# Patient Record
Sex: FEMALE | Race: WHITE | NOT HISPANIC OR LATINO | URBAN - METROPOLITAN AREA
[De-identification: names, ages, dates, MRNs, and addresses within clinical notes are randomized per-mention and may not be internally consistent; named-entity substitution may affect disease eponyms.]

---

## 2019-03-12 ENCOUNTER — EMERGENCY (EMERGENCY)
Facility: HOSPITAL | Age: 54
LOS: 0 days | Discharge: HOME | End: 2019-03-12
Attending: EMERGENCY MEDICINE | Admitting: EMERGENCY MEDICINE

## 2019-03-12 VITALS
OXYGEN SATURATION: 97 % | SYSTOLIC BLOOD PRESSURE: 110 MMHG | HEART RATE: 66 BPM | DIASTOLIC BLOOD PRESSURE: 60 MMHG | RESPIRATION RATE: 18 BRPM

## 2019-03-12 VITALS
OXYGEN SATURATION: 99 % | DIASTOLIC BLOOD PRESSURE: 81 MMHG | WEIGHT: 229.94 LBS | SYSTOLIC BLOOD PRESSURE: 128 MMHG | HEIGHT: 64 IN | TEMPERATURE: 98 F | HEART RATE: 68 BPM | RESPIRATION RATE: 18 BRPM

## 2019-03-12 DIAGNOSIS — I10 ESSENTIAL (PRIMARY) HYPERTENSION: ICD-10-CM

## 2019-03-12 DIAGNOSIS — T38.3X1A POISONING BY INSULIN AND ORAL HYPOGLYCEMIC [ANTIDIABETIC] DRUGS, ACCIDENTAL (UNINTENTIONAL), INITIAL ENCOUNTER: ICD-10-CM

## 2019-03-12 DIAGNOSIS — T39.011A POISONING BY ASPIRIN, ACCIDENTAL (UNINTENTIONAL), INITIAL ENCOUNTER: ICD-10-CM

## 2019-03-12 DIAGNOSIS — T46.3X1A: ICD-10-CM

## 2019-03-12 DIAGNOSIS — T44.7X1A POISONING BY BETA-ADRENORECEPTOR ANTAGONISTS, ACCIDENTAL (UNINTENTIONAL), INITIAL ENCOUNTER: ICD-10-CM

## 2019-03-12 DIAGNOSIS — R42 DIZZINESS AND GIDDINESS: ICD-10-CM

## 2019-03-12 DIAGNOSIS — T46.4X1A POISONING BY ANGIOTENSIN-CONVERTING-ENZYME INHIBITORS, ACCIDENTAL (UNINTENTIONAL), INITIAL ENCOUNTER: ICD-10-CM

## 2019-03-12 DIAGNOSIS — T50.991A POISONING BY OTHER DRUGS, MEDICAMENTS AND BIOLOGICAL SUBSTANCES, ACCIDENTAL (UNINTENTIONAL), INITIAL ENCOUNTER: ICD-10-CM

## 2019-03-12 DIAGNOSIS — Z79.899 OTHER LONG TERM (CURRENT) DRUG THERAPY: ICD-10-CM

## 2019-03-12 DIAGNOSIS — Z87.891 PERSONAL HISTORY OF NICOTINE DEPENDENCE: ICD-10-CM

## 2019-03-12 RX ORDER — ATORVASTATIN CALCIUM 80 MG/1
0 TABLET, FILM COATED ORAL
Qty: 0 | Refills: 0 | COMMUNITY

## 2019-03-12 RX ORDER — HYDROCHLOROTHIAZIDE 25 MG
0 TABLET ORAL
Qty: 0 | Refills: 0 | COMMUNITY

## 2019-03-12 NOTE — ED PROVIDER NOTE - CLINICAL SUMMARY MEDICAL DECISION MAKING FREE TEXT BOX
pt presented for eval after taking wrong meds. pt observed. discussed with tox. pt feel improved dc home

## 2019-03-12 NOTE — ED PROVIDER NOTE - ATTENDING CONTRIBUTION TO CARE
53yr old female here for eval after taking husbands  lisonpril 5mg, isosorbide 30mg, plavix 75mg,. metoprolol 25mg, ezitimbe 10mg, atorvastatin 10mg, metformin 1000mg, asa. pt also took own  atorvastatin, and bp med combo that contains hctz. (pt doesn't know other med). pt reports initally feeling lightheaded. now has no complaints. pt has no known abnml kid fx. CON: ao x 3, HENMT: clear oropharynx,  neck supple,  CV: rrr, equal pulses b/l, RESP: cta b/l, GI:  soft, nontender, no rebound, no guarding, SKIN: no rash, MSK: no deformities, NEURO: no gross motor or sensory deficit Psychiatric: appropriate mood, appropriate affect    impression medication error, fs, ekg, discuss with tox

## 2019-03-12 NOTE — ED PROVIDER NOTE - NS ED ROS FT
Review of Systems:  	•	CONSTITUTIONAL - no fever, no diaphoresis, no chills  	•	SKIN - no rash  	•	HEMATOLOGIC - no bleeding, no bruising  	•	EYES - no eye pain, no blurry vision  	•	ENT - no congestion  	•	RESPIRATORY - no shortness of breath, no cough  	•	CARDIAC - no chest pain, no palpitations  	•	GI - no abd pain, no nausea, no vomiting, no diarrhea, no constipation  	•	GENITO-URINARY - no dysuria; no hematuria, no increased urinary frequency  	•	MUSCULOSKELETAL - no joint paint, no swelling, no redness  	•	NEUROLOGIC - +lightheaded, no weakness, no headache, no paresthesias, no LOC  	•	PSYCH - no anxiety, no depression  	All other ROS are negative except as documented in HPI.

## 2019-03-12 NOTE — ED ADULT NURSE REASSESSMENT NOTE - NS ED NURSE REASSESS COMMENT FT1
Patient states" I am feeling better now." Vital signs stable, awaiting further disposition, will continue to closely monitor.

## 2019-03-12 NOTE — ED ADULT NURSE NOTE - NSIMPLEMENTINTERV_GEN_ALL_ED
Implemented All Universal Safety Interventions:  [place] to call system. Call bell, personal items and telephone within reach. Instruct patient to call for assistance. Room bathroom lighting operational. Non-slip footwear when patient is off stretcher. Physically safe environment: no spills, clutter or unnecessary equipment. Stretcher in lowest position, wheels locked, appropriate side rails in place.

## 2019-03-12 NOTE — ED PROVIDER NOTE - CARE PLAN
Principal Discharge DX:	Medication administered in error, accidental or unintentional, initial encounter

## 2019-03-12 NOTE — ED PROVIDER NOTE - OBJECTIVE STATEMENT
52 yo F with pmhx of HTN, HLD presenting for evaluation after accidently taking her 's medications. Pt states she normally takes atorvastatin and HCTZ for HTN and HLD in which she did take. States she took her husbands medications which include lisinopril 5mg, isosorbide 30mg, clopidogrel 75mg, metoprolol 25mg, ezitimbe 10mg, metformin 1000mg, aspirin. 52 yo F with pmhx of HTN, HLD presenting for evaluation after accidently taking her 's medications. Pt states she normally takes atorvastatin and HCTZ for HTN and HLD in which she did take. States she took her husbands medications which include lisinopril 5mg, isosorbide 30mg, clopidogrel 75mg, metoprolol 25mg, ezitimbe 10mg, metformin 1000mg, aspirin this morning about 30 minutes prior to arrival. Pt reports feeling lightheaded. Symptoms are mild. No alleviating or aggravating factors. No cp, sob, fever, chills, abdominal pain, nausea, vomiting, diarrhea, back pain, urinary symptoms, headache, dizziness, paresthesias, or weakness.

## 2019-03-12 NOTE — ED PROVIDER NOTE - PROGRESS NOTE DETAILS
Spoke to toxicology Azar states patient can be cleared if metoprolol was instant release if extended release pt should be observed until 1pm. No need for labs. Pt sleeping comfortably vitals stable will continue to monitor. Metoprolol succinate is extended release confirmed with 's pharmacy, Pt feeling much better. Will dc home with follow up with PMD.

## 2019-03-12 NOTE — ED ADULT TRIAGE NOTE - CHIEF COMPLAINT QUOTE
As per patient "I took my 's meds and my own meds this morning". Patient complaining of lightheadedness. Patient states  takes metoprolol, metformin, clopidogrel and ezetimbe.